# Patient Record
Sex: MALE | Race: WHITE | Employment: UNEMPLOYED | URBAN - METROPOLITAN AREA
[De-identification: names, ages, dates, MRNs, and addresses within clinical notes are randomized per-mention and may not be internally consistent; named-entity substitution may affect disease eponyms.]

---

## 2017-04-04 ENCOUNTER — MEDICAL CORRESPONDENCE (OUTPATIENT)
Dept: HEALTH INFORMATION MANAGEMENT | Facility: CLINIC | Age: 37
End: 2017-04-04

## 2017-08-17 ENCOUNTER — PRE VISIT (OUTPATIENT)
Dept: CARDIOLOGY | Facility: CLINIC | Age: 37
End: 2017-08-17

## 2017-08-17 DIAGNOSIS — Q20.3 TGA (TRANSPOSITION OF GREAT ARTERIES): Primary | ICD-10-CM

## 2017-08-18 ENCOUNTER — OFFICE VISIT (OUTPATIENT)
Dept: CARDIOLOGY | Facility: CLINIC | Age: 37
End: 2017-08-18
Attending: INTERNAL MEDICINE
Payer: COMMERCIAL

## 2017-08-18 VITALS
HEART RATE: 77 BPM | SYSTOLIC BLOOD PRESSURE: 101 MMHG | HEIGHT: 63 IN | BODY MASS INDEX: 23.04 KG/M2 | WEIGHT: 130 LBS | OXYGEN SATURATION: 94 % | DIASTOLIC BLOOD PRESSURE: 67 MMHG

## 2017-08-18 DIAGNOSIS — Q20.3 TGA (TRANSPOSITION OF GREAT ARTERIES): Primary | ICD-10-CM

## 2017-08-18 DIAGNOSIS — Q20.3 TGA (TRANSPOSITION OF GREAT ARTERIES): ICD-10-CM

## 2017-08-18 DIAGNOSIS — I49.8 ARRHYTHMIA, ATRIAL: ICD-10-CM

## 2017-08-18 LAB
ALBUMIN SERPL-MCNC: 3.4 G/DL (ref 3.4–5)
ALP SERPL-CCNC: 192 U/L (ref 40–150)
ALT SERPL W P-5'-P-CCNC: 21 U/L (ref 0–70)
ANION GAP SERPL CALCULATED.3IONS-SCNC: 9 MMOL/L (ref 3–14)
AST SERPL W P-5'-P-CCNC: 20 U/L (ref 0–45)
BASOPHILS # BLD AUTO: 0 10E9/L (ref 0–0.2)
BASOPHILS NFR BLD AUTO: 0.7 %
BILIRUB SERPL-MCNC: 0.9 MG/DL (ref 0.2–1.3)
BUN SERPL-MCNC: 15 MG/DL (ref 7–30)
CALCIUM SERPL-MCNC: 9 MG/DL (ref 8.5–10.1)
CHLORIDE SERPL-SCNC: 101 MMOL/L (ref 94–109)
CHOLEST SERPL-MCNC: 129 MG/DL
CO2 SERPL-SCNC: 28 MMOL/L (ref 20–32)
CREAT SERPL-MCNC: 0.49 MG/DL (ref 0.66–1.25)
DIFFERENTIAL METHOD BLD: ABNORMAL
EOSINOPHIL # BLD AUTO: 0.5 10E9/L (ref 0–0.7)
EOSINOPHIL NFR BLD AUTO: 10.9 %
ERYTHROCYTE [DISTWIDTH] IN BLOOD BY AUTOMATED COUNT: 13.4 % (ref 10–15)
GFR SERPL CREATININE-BSD FRML MDRD: >90 ML/MIN/1.7M2
GLUCOSE SERPL-MCNC: 152 MG/DL (ref 70–99)
HCT VFR BLD AUTO: 39.3 % (ref 40–53)
HDLC SERPL-MCNC: 35 MG/DL
HGB BLD-MCNC: 12.6 G/DL (ref 13.3–17.7)
IMM GRANULOCYTES # BLD: 0 10E9/L (ref 0–0.4)
IMM GRANULOCYTES NFR BLD: 0 %
LDLC SERPL CALC-MCNC: 87 MG/DL
LYMPHOCYTES # BLD AUTO: 0.7 10E9/L (ref 0.8–5.3)
LYMPHOCYTES NFR BLD AUTO: 15.1 %
MCH RBC QN AUTO: 29.6 PG (ref 26.5–33)
MCHC RBC AUTO-ENTMCNC: 32.1 G/DL (ref 31.5–36.5)
MCV RBC AUTO: 93 FL (ref 78–100)
MONOCYTES # BLD AUTO: 0.6 10E9/L (ref 0–1.3)
MONOCYTES NFR BLD AUTO: 12.7 %
NEUTROPHILS # BLD AUTO: 2.7 10E9/L (ref 1.6–8.3)
NEUTROPHILS NFR BLD AUTO: 60.6 %
NONHDLC SERPL-MCNC: 94 MG/DL
NRBC # BLD AUTO: 0 10*3/UL
NRBC BLD AUTO-RTO: 0 /100
PLATELET # BLD AUTO: 214 10E9/L (ref 150–450)
POTASSIUM SERPL-SCNC: 3.9 MMOL/L (ref 3.4–5.3)
PROT SERPL-MCNC: 7.4 G/DL (ref 6.8–8.8)
RBC # BLD AUTO: 4.25 10E12/L (ref 4.4–5.9)
SODIUM SERPL-SCNC: 137 MMOL/L (ref 133–144)
TRIGL SERPL-MCNC: 34 MG/DL
TSH SERPL DL<=0.005 MIU/L-ACNC: 2.78 MU/L (ref 0.4–4)
WBC # BLD AUTO: 4.5 10E9/L (ref 4–11)

## 2017-08-18 PROCEDURE — 99214 OFFICE O/P EST MOD 30 MIN: CPT | Mod: ZP | Performed by: INTERNAL MEDICINE

## 2017-08-18 PROCEDURE — 85025 COMPLETE CBC W/AUTO DIFF WBC: CPT | Performed by: INTERNAL MEDICINE

## 2017-08-18 PROCEDURE — 80053 COMPREHEN METABOLIC PANEL: CPT | Performed by: INTERNAL MEDICINE

## 2017-08-18 PROCEDURE — 80061 LIPID PANEL: CPT | Performed by: INTERNAL MEDICINE

## 2017-08-18 PROCEDURE — 36415 COLL VENOUS BLD VENIPUNCTURE: CPT | Performed by: INTERNAL MEDICINE

## 2017-08-18 PROCEDURE — 84443 ASSAY THYROID STIM HORMONE: CPT | Performed by: INTERNAL MEDICINE

## 2017-08-18 PROCEDURE — 99212 OFFICE O/P EST SF 10 MIN: CPT | Mod: 25,ZF

## 2017-08-18 ASSESSMENT — PAIN SCALES - GENERAL: PAINLEVEL: NO PAIN (0)

## 2017-08-18 NOTE — NURSING NOTE
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 The monitor was placed on the patient with instructions regarding care of the skin electrodes and monitor, as well as documentation in the patient diary. Patient demonstrated understanding of this information and agreed to call with further questions or concerns.  Cardiac Testing: Patient given instructions regarding  echocardiogram . Discussed purpose, preparation, procedure and when to expect results reported back to the patient. Patient demonstrated understanding of this information and agreed to call with further questions or concerns.  Med Reconcile: Reviewed and verified all current medications with the patient. The updated medication list was printed and given to the patient.  Return Appointment: Patient given instructions regarding scheduling next clinic visit. Patient demonstrated understanding of this information and agreed to call with further questions or concerns.  Patient stated he understood all health information given and agreed to call with further questions or concerns.     Celena Grant RN, BSN  Cardiology Care Coordinator  [facility]  [email or web address]  [phone]

## 2017-08-18 NOTE — NURSING NOTE
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    Renea Choe CMA     10:59 AM

## 2017-08-18 NOTE — LETTER
8/18/2017      RE: Migue Wood  [address] [id number]       Dear Colleague,    Thank you for the opportunity to participate in the care of your patient, Migue Wood, at the [facility] HEART [facility]. Please see a copy of my visit note below.    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  He is accompanied by his mother and caregiver.  Overall they relate that patient has been doing well.  They deny denies any c/o of chest pain or pressure, sob/wade, orthopnea, pnd, palpitations, syncope/presyncope or change in chronic carleen.  They deny any problems with medications and reports compliance.        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  Dad HTN.  One brother healthy.  No known family history of early CAD, CHD or SCD.      SOCIAL HISTORY:  History     Social History     Marital Status: Single     Spouse Name: N/A     Number of Children: N/A     Years of Education: N/A     Social History Main Topics     Smoking status: Never Smoker      Smokeless tobacco: None     Alcohol Use:      Drug Use:      Sexually Active:      Other Topics Concern     None     Social History Narrative       CURRENT MEDICATIONS:    Current Outpatient Prescriptions on File Prior to Visit:  zinc oxide 10 % OINT    diazepam (VALIUM) 1 MG TABS Take 1 mg by mouth every 6 hours as needed for anxiety   polyethylene glycol (MIRALAX/GLYCOLAX) powder Take 17 g by mouth daily Per facility  directions   CLINDAMYCIN HCL Solution 1% twice daily to affected area   acetaminophen (TYLENOL) 500 MG tablet Take 1-2 tablets by mouth every 4 hours as needed.   aspirin 81 MG chewable tablet Take 81 mg by mouth daily.   baclofen (LIORESAL) 10 MG tablet Take 0.5 tablets by mouth 3 times daily.   benzoyl peroxide 5 % CREA Externally apply  topically 2 times daily.   clindamycin (CLEOCIN) 150 MG capsule Take 4 capsules by mouth. One hour before dental appointment.   diazepam (VALIUM) 2 MG tablet Take 2 mg by mouth daily.   docusate sodium 100 MG tablet Take 100 mg by mouth daily as needed.   fish oil-omega-3 fatty acids (FISH OIL) 1000 MG capsule Take 4 capsules by mouth daily.   Glycerin, Laxative, (GLYCERIN, ADULT, RE) Place  rectally. Use as directed or every 3 days as needed.   LamoTRIgine 100 MG TBDP Take 1 tablet by mouth 2 times daily.   Magnesium Hydroxide (MILK OF MAGNESIA PO) Take 30 mLs by mouth as needed. Or after 3 days as directed.   sotalol HCl AF (SOTALOL HCL, AF,) 80 MG TABS Take 1 tablet by mouth 2 times daily.   HYDROcodone-acetaminophen (NORCO) 5-325 MG per tablet Take 1 tablet by mouth every 6 hours as needed for moderate to severe pain   calcium-vitamin D (CALCIUM 600 + D) 600-400 MG-UNIT per tablet Take 2 tablets by mouth 2 times daily.     No current facility-administered medications on file prior to visit.       ROS:   Constitutional: No fever, chills, or sweats. No weight gain/loss.   ENT: No visual disturbance, ear ache, epistaxis, sore throat.   Allergies/Immunologic: Negative.   Respiratory: No cough, hemoptysis.   Cardiovascular: As per HPI.   GI: No nausea, vomiting, hematemesis, melena, or hematochezia.   : No urinary frequency, dysuria, or hematuria.   Integument: Negative.   Psychiatric: Negative.   Neuro: Negative.   Endocrinology: Negative.   Musculoskeletal: No changes    EXAM:  /67 (BP Location: Right arm, Patient Position: Chair, Cuff Size: Adult Regular)  Pulse 77  Ht  "1.6 m (5' 3\")  Wt 59 kg (130 lb)  SpO2 94%  BMI 23.03 kg/m2  General: able to answer some simple yes or no questions.  Exam performed with patient in wheelchair  Head: normocephalic, atraumatic  Eyes: anicteric sclera, EOMI  Neck: no adenopathy, 2+ carotids  Orophyarynx: moist mucosa, no lesions, dentition intact  Heart: regular, S1/S2, 3/6 systilic murmur at LSB, no diastolic murmur appreciated, no gallop or rub, estimated JVP 6-7cm  Lungs: clear, no rales or wheezing  Abdomen: soft, non-tender, bowel sounds present, no hepatomegaly  Extremities: no clubbing, cyanosis.  Trace pedal edema.      Labs:  Orders Only on 08/18/2017   Component Date Value Ref Range Status     Sodium 08/18/2017 137  133 - 144 mmol/L Final     Potassium 08/18/2017 3.9  3.4 - 5.3 mmol/L Final     Chloride 08/18/2017 101  94 - 109 mmol/L Final     Carbon Dioxide 08/18/2017 28  20 - 32 mmol/L Final     Anion Gap 08/18/2017 9  3 - 14 mmol/L Final     Glucose 08/18/2017 152* 70 - 99 mg/dL Final     Urea Nitrogen 08/18/2017 15  7 - 30 mg/dL Final     Creatinine 08/18/2017 0.49* 0.66 - 1.25 mg/dL Final     GFR Estimate 08/18/2017 >90  >60 mL/min/1.7m2 Final    Non  GFR Calc     GFR Estimate If Black 08/18/2017 >90  >60 mL/min/1.7m2 Final    African American GFR Calc     Calcium 08/18/2017 9.0  8.5 - 10.1 mg/dL Final     Bilirubin Total 08/18/2017 0.9  0.2 - 1.3 mg/dL Final     Albumin 08/18/2017 3.4  3.4 - 5.0 g/dL Final     Protein Total 08/18/2017 7.4  6.8 - 8.8 g/dL Final     Alkaline Phosphatase 08/18/2017 192* 40 - 150 U/L Final     ALT 08/18/2017 21  0 - 70 U/L Final     AST 08/18/2017 20  0 - 45 U/L Final     WBC 08/18/2017 4.5  4.0 - 11.0 10e9/L Final     RBC Count 08/18/2017 4.25* 4.4 - 5.9 10e12/L Final     Hemoglobin 08/18/2017 12.6* 13.3 - 17.7 g/dL Final     Hematocrit 08/18/2017 39.3* 40.0 - 53.0 % Final     MCV 08/18/2017 93  78 - 100 fl Final     MCH 08/18/2017 29.6  26.5 - 33.0 pg Final     MCHC 08/18/2017 32.1  " 31.5 - 36.5 g/dL Final     RDW 08/18/2017 13.4  10.0 - 15.0 % Final     Platelet Count 08/18/2017 214  150 - 450 10e9/L Final     Diff Method 08/18/2017 Automated Method   Final     % Neutrophils 08/18/2017 60.6  % Final     % Lymphocytes 08/18/2017 15.1  % Final     % Monocytes 08/18/2017 12.7  % Final     % Eosinophils 08/18/2017 10.9  % Final     % Basophils 08/18/2017 0.7  % Final     % Immature Granulocytes 08/18/2017 0.0  % Final     Nucleated RBCs 08/18/2017 0  0 /100 Final     Absolute Neutrophil 08/18/2017 2.7  1.6 - 8.3 10e9/L Final     Absolute Lymphocytes 08/18/2017 0.7* 0.8 - 5.3 10e9/L Final     Absolute Monocytes 08/18/2017 0.6  0.0 - 1.3 10e9/L Final     Absolute Eosinophils 08/18/2017 0.5  0.0 - 0.7 10e9/L Final     Absolute Basophils 08/18/2017 0.0  0.0 - 0.2 10e9/L Final     Abs Immature Granulocytes 08/18/2017 0.0  0 - 0.4 10e9/L Final     Absolute Nucleated RBC 08/18/2017 0.0   Final     TSH 08/18/2017 2.78  0.40 - 4.00 mU/L Final     Cholesterol 08/18/2017 129  <200 mg/dL Final     Triglycerides 08/18/2017 34  <150 mg/dL Final     HDL Cholesterol 08/18/2017 35* >39 mg/dL Final     LDL Cholesterol Calculated 08/18/2017 87  <100 mg/dL Final    Desirable:       <100 mg/dl     Non HDL Cholesterol 08/18/2017 94  <130 mg/dL Final           Assessment and Plan:  38 yo male with PMH of Transposition of the Great Arteries, 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     1.  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  He remains grossly euvolemic today by history and exam.  Today's echo confirmed stable findings. Have discussed with patient's family/caregivers need to continue regular dental  care with SBE prophylaxis and maintain good oral hygiene.  .  3.  H/o atrial arrhythmias:  No recent symptoms.  Holter last month with no significant arrythmias. Contine asa 81mg daily and sotolol 80mg bid.      Follow-up: 1 year with an echo and a 24 hr Holter prior to the appointment.   Will be happy to see sooner if change in clinical status or new questions/concerns arise.        Lacey Jacobsen MD  Section Head - Advanced Heart Failure, Transplantation and Mechanical Circulatory Support  Co-Director - Adult Congenital and Cardiovascular Genetics Center  Associate Professor of Medicine, [facility]

## 2017-08-18 NOTE — MR AVS SNAPSHOT
"              After Visit Summary   8/18/2017    Migue Wood    MRN: [id number]           Patient Information     Date Of Birth          1980        Visit Information        Provider Department      8/18/2017 11:00 AM Lacey Jacobsen MD [facility]        Today's Diagnoses     TGA (transposition of great arteries)    -  1    Arrhythmia, atrial          Care Instructions    You were seen today in the Adult Congenital and Cardiovascular Genetics Clinic at the [facility].    Cardiology Providers you saw during your visit:  Dr. Lacey Jacobsen     Diagnosis:  TGA    Results:  The results of your echo were discussed with you today.    Recommendations:    1.  Continue to eat a heart healthy, low salt diet.  2.  Continue to get 20-30 minutes of aerobic activity, 4-5 days per week.  Examples of aerobic activity include walking, running, swimming, cycling, etc.  3.  Continue to observe good oral hygiene, with regular dental visits.  4.  No changes today.       Vitals:    08/18/17 1050   BP: 101/67   BP Location: Right arm   Patient Position: Chair   Cuff Size: Adult Regular   Pulse: 77   SpO2: 94%   Weight: 59 kg (130 lb)   Height: 1.6 m (5' 3\")       SBE prophylaxis:   Yes_X___  No____    Lifelong Bacterial Endocarditis Prophylaxis:  YES__X__  NO____    If YES is checked, follow the recommendations outlined below:  1. Take antibiotic(s) prior to interventional procedures or surgeries (dental, respiratory, urologic, gastrointestinal, gynecologic), or instrumental examinations.   2. Observe good oral hygiene daily, as advised by your dentist. Get regular professional dental care.  3. Keep cuts clean.  4. Infections should be treated promptly.      Exercise restrictions:   Yes__X__  No____         If yes, list restrictions:  Must be allowed to rest if fatigued or SOB      Work restrictions:  Yes____  No__X__         If yes, list restrictions:      Follow-up:  Follow up with Dr. Jacobsen " in 1 year with an echo and a 24 hr Holter prior to the appointment.       For after hours urgent needs, call [phone] and ask to speak to the Adult Congenital Physician on call.  Mention Job Code 0401.    For emergencies call 911.    For any scheduling needs and to contact your nurse in the Adult Congenital and Cardiovascular Genetics Clinic, please call John Carranza, Procedure , at [phone].    Thank you for your visit today!  If you have questions or concerns about today's visit, please call me.    Celena Grant, RN, BSN  Cardiology Care Coordinator  [facility]  [email or web address]  Ph.[phone]    [facility]  [facility]  Mail Code [id number]   [place]  [address]           Follow-ups after your visit        Follow-up notes from your care team     Return in about 1 year (around 8/18/2018) for ACHD Follow up with Dr. Jacobsen.      Future tests that were ordered for you today     Open Future Orders        Priority Expected Expires Ordered    Holter monitor 24 hour Routine  8/2/2018 8/18/2017    Echo congenital adult Routine  8/18/2018 8/18/2017            Who to contact     If you have questions or need follow up information about today's clinic visit or your schedule please contact [facility] directly at [phone].  Normal or non-critical lab and imaging results will be communicated to you by MyChart, letter or phone within 4 business days after the clinic has received the results. If you do not hear from us within 7 days, please contact the clinic through MyChart or phone. If you have a critical or abnormal lab result, we will notify you by phone as soon as possible.  Submit refill requests through [employer] or call your pharmacy and they will forward the refill request to us. Please allow 3 business days for your refill to be completed.        "   Additional Information About Your Visit        MyChart Information     Ahead lets you send messages to your doctor, view your test results, renew your prescriptions, schedule appointments and more. To sign up, go to [email or web address] . Click on \"Log in\" on the left side of the screen, which will take you to the Welcome page. Then click on \"Sign up Now\" on the right side of the page.     You will be asked to enter the access code listed below, as well as some personal information. Please follow the directions to create your username and password.     Your access code is: [email or web address]  Expires: 2017  6:31 AM     Your access code will  in 90 days. If you need help or a new code, please call your [place] clinic or [phone].        Care EveryWhere ID     This is your Care EveryWhere ID. This could be used by other organizations to access your [place] medical records  [id number]        Your Vitals Were     Pulse Height Pulse Oximetry BMI (Body Mass Index)          77 1.6 m (5' 3\") 94% 23.03 kg/m2         Blood Pressure from Last 3 Encounters:   17 101/67   16 108/67   06/02/15 104/70    Weight from Last 3 Encounters:   17 59 kg (130 lb)   16 64.9 kg (143 lb)   06/02/15 68 kg (150 lb)               Primary Care Provider Office Phone # Fax #    [facility] [phone] [address] [address]        Equal Access to Services     [facility]: Hadii silas lobo hadkirit Sojoy, waaxda luqadaha, qaybta kaalmada wander catalan . So [facility] [phone].    ATENCIÓN: Si habla español, tiene a wiley disposición servicios gratuitos de asistencia lingüística. Llame al [phone].    We comply with applicable federal civil rights laws and [place] laws. We do not discriminate on the basis of race, color, national origin, age, disability sex, sexual orientation or gender identity.            Thank you! "     Thank you for choosing [facility]  for your care. Our goal is always to provide you with excellent care. Hearing back from our patients is one way we can continue to improve our services. Please take a few minutes to complete the written survey that you may receive in the mail after your visit with us. Thank you!             Your Updated Medication List - Protect others around you: Learn how to safely use, store and throw away your medicines at www.disposemymeds.org.          This list is accurate as of: 8/18/17 11:40 AM.  Always use your most recent med list.                   Brand Name Dispense Instructions for use Diagnosis    acetaminophen 500 MG tablet    TYLENOL     Take 1-2 tablets by mouth every 4 hours as needed.        aspirin 81 MG chewable tablet      Take 81 mg by mouth daily.        baclofen 10 MG tablet    LIORESAL     Take 0.5 tablets by mouth 3 times daily.        benzoyl peroxide 5 % Crea      Externally apply  topically 2 times daily.        calcium 600 + D 600-400 MG-UNIT per tablet   Generic drug:  calcium-vitamin D      Take 2 tablets by mouth 2 times daily.        clindamycin 150 MG capsule    CLEOCIN     Take 4 capsules by mouth. One hour before dental appointment.        CLINDAMYCIN HCL      Solution 1% twice daily to affected area        * diazepam 2 MG tablet    VALIUM     Take 2 mg by mouth daily.        * diazepam 1 mg Tabs half-tab    VALIUM     Take 1 mg by mouth every 6 hours as needed for anxiety        docusate sodium 100 MG tablet    COLACE     Take 100 mg by mouth daily as needed.        fish oil-omega-3 fatty acids 1000 MG capsule      Take 4 capsules by mouth daily.        GLYCERIN (ADULT) RE      Place  rectally. Use as directed or every 3 days as needed.        HYDROcodone-acetaminophen 5-325 MG per tablet    NORCO     Take 1 tablet by mouth every 6 hours as needed for moderate to severe pain        lamoTRIgine 100 MG Tbdp ODT tab    LaMICtal     Take 1 tablet by  mouth 2 times daily.        MILK OF MAGNESIA PO      Take 30 mLs by mouth as needed. Or after 3 days as directed.        polyethylene glycol powder    MIRALAX/GLYCOLAX     Take 17 g by mouth daily Per facility directions        sotalol HCl AF 80 MG Tabs      Take 1 tablet by mouth 2 times daily.        zinc oxide 10 % Oint           * Notice:  This list has 2 medication(s) that are the same as other medications prescribed for you. Read the directions carefully, and ask your doctor or other care provider to review them with you.

## 2017-08-18 NOTE — PATIENT INSTRUCTIONS
"You were seen today in the Adult Congenital and Cardiovascular Genetics Clinic at the [facility].    Cardiology Providers you saw during your visit:  Dr. Lacey Jacobsen     Diagnosis:  TGA    Results:  The results of your echo were discussed with you today.    Recommendations:    1.  Continue to eat a heart healthy, low salt diet.  2.  Continue to get 20-30 minutes of aerobic activity, 4-5 days per week.  Examples of aerobic activity include walking, running, swimming, cycling, etc.  3.  Continue to observe good oral hygiene, with regular dental visits.  4.  No changes today.       Vitals:    08/18/17 1050   BP: 101/67   BP Location: Right arm   Patient Position: Chair   Cuff Size: Adult Regular   Pulse: 77   SpO2: 94%   Weight: 59 kg (130 lb)   Height: 1.6 m (5' 3\")       SBE prophylaxis:   Yes_X___  No____    Lifelong Bacterial Endocarditis Prophylaxis:  YES__X__  NO____    If YES is checked, follow the recommendations outlined below:  1. Take antibiotic(s) prior to interventional procedures or surgeries (dental, respiratory, urologic, gastrointestinal, gynecologic), or instrumental examinations.   2. Observe good oral hygiene daily, as advised by your dentist. Get regular professional dental care.  3. Keep cuts clean.  4. Infections should be treated promptly.      Exercise restrictions:   Yes__X__  No____         If yes, list restrictions:  Must be allowed to rest if fatigued or SOB      Work restrictions:  Yes____  No__X__         If yes, list restrictions:      Follow-up:  Follow up with Dr. Jacobsen in 1 year with an echo and a 24 hr Holter prior to the appointment.       For after hours urgent needs, call [phone] and ask to speak to the Adult Congenital Physician on call.  Mention Job Code 0401.    For emergencies call 911.    For any scheduling needs and to contact your nurse in the Adult Congenital and Cardiovascular Genetics Clinic, please call John Carranza, Procedure , at " [phone].    Thank you for your visit today!  If you have questions or concerns about today's visit, please call me.    Celena Grant RN, BSN  Cardiology Care Coordinator  [facility]  [email or web address]  Ph.[phone]    [facility]  [facility]  Mail Code [address]CK  [address] [address]

## 2017-08-18 NOTE — PROGRESS NOTES
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  He is accompanied by his mother and caregiver.  Overall they relate that patient has been doing well.  They deny denies any c/o of chest pain or pressure, sob/wade, orthopnea, pnd, palpitations, syncope/presyncope or change in chronic carleen.  They deny any problems with medications and reports compliance.        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  Dad HTN.  One brother healthy.  No known family history of early CAD, CHD or SCD.      SOCIAL HISTORY:  History     Social History     Marital Status: Single     Spouse Name: N/A     Number of Children: N/A     Years of Education: N/A     Social History Main Topics     Smoking status: Never Smoker      Smokeless tobacco: None     Alcohol Use:      Drug Use:      Sexually Active:      Other Topics Concern     None     Social History Narrative       CURRENT MEDICATIONS:    Current Outpatient Prescriptions on File Prior to Visit:  zinc oxide 10 % OINT    diazepam (VALIUM) 1 MG TABS Take 1 mg by mouth every 6 hours as needed for anxiety   polyethylene glycol (MIRALAX/GLYCOLAX) powder Take 17 g by mouth daily Per facility directions   CLINDAMYCIN HCL Solution 1% twice daily to affected area   acetaminophen (TYLENOL) 500 MG tablet Take 1-2 tablets by mouth every 4 hours as needed.   aspirin 81 MG chewable tablet Take 81 mg by mouth daily.   baclofen (LIORESAL) 10 MG tablet Take 0.5 tablets by mouth 3 times daily.   benzoyl peroxide 5 % CREA  "Externally apply  topically 2 times daily.   clindamycin (CLEOCIN) 150 MG capsule Take 4 capsules by mouth. One hour before dental appointment.   diazepam (VALIUM) 2 MG tablet Take 2 mg by mouth daily.   docusate sodium 100 MG tablet Take 100 mg by mouth daily as needed.   fish oil-omega-3 fatty acids (FISH OIL) 1000 MG capsule Take 4 capsules by mouth daily.   Glycerin, Laxative, (GLYCERIN, ADULT, RE) Place  rectally. Use as directed or every 3 days as needed.   LamoTRIgine 100 MG TBDP Take 1 tablet by mouth 2 times daily.   Magnesium Hydroxide (MILK OF MAGNESIA PO) Take 30 mLs by mouth as needed. Or after 3 days as directed.   sotalol HCl AF (SOTALOL HCL, AF,) 80 MG TABS Take 1 tablet by mouth 2 times daily.   HYDROcodone-acetaminophen (NORCO) 5-325 MG per tablet Take 1 tablet by mouth every 6 hours as needed for moderate to severe pain   calcium-vitamin D (CALCIUM 600 + D) 600-400 MG-UNIT per tablet Take 2 tablets by mouth 2 times daily.     No current facility-administered medications on file prior to visit.       ROS:   Constitutional: No fever, chills, or sweats. No weight gain/loss.   ENT: No visual disturbance, ear ache, epistaxis, sore throat.   Allergies/Immunologic: Negative.   Respiratory: No cough, hemoptysis.   Cardiovascular: As per HPI.   GI: No nausea, vomiting, hematemesis, melena, or hematochezia.   : No urinary frequency, dysuria, or hematuria.   Integument: Negative.   Psychiatric: Negative.   Neuro: Negative.   Endocrinology: Negative.   Musculoskeletal: No changes    EXAM:  /67 (BP Location: Right arm, Patient Position: Chair, Cuff Size: Adult Regular)  Pulse 77  Ht 1.6 m (5' 3\")  Wt 59 kg (130 lb)  SpO2 94%  BMI 23.03 kg/m2  General: able to answer some simple yes or no questions.  Exam performed with patient in wheelchair  Head: normocephalic, atraumatic  Eyes: anicteric sclera, EOMI  Neck: no adenopathy, 2+ carotids  Orophyarynx: moist mucosa, no lesions, dentition " intact  Heart: regular, S1/S2, 3/6 systilic murmur at LSB, no diastolic murmur appreciated, no gallop or rub, estimated JVP 6-7cm  Lungs: clear, no rales or wheezing  Abdomen: soft, non-tender, bowel sounds present, no hepatomegaly  Extremities: no clubbing, cyanosis.  Trace pedal edema.      Labs:  Orders Only on 08/18/2017   Component Date Value Ref Range Status     Sodium 08/18/2017 137  133 - 144 mmol/L Final     Potassium 08/18/2017 3.9  3.4 - 5.3 mmol/L Final     Chloride 08/18/2017 101  94 - 109 mmol/L Final     Carbon Dioxide 08/18/2017 28  20 - 32 mmol/L Final     Anion Gap 08/18/2017 9  3 - 14 mmol/L Final     Glucose 08/18/2017 152* 70 - 99 mg/dL Final     Urea Nitrogen 08/18/2017 15  7 - 30 mg/dL Final     Creatinine 08/18/2017 0.49* 0.66 - 1.25 mg/dL Final     GFR Estimate 08/18/2017 >90  >60 mL/min/1.7m2 Final    Non  GFR Calc     GFR Estimate If Black 08/18/2017 >90  >60 mL/min/1.7m2 Final    African American GFR Calc     Calcium 08/18/2017 9.0  8.5 - 10.1 mg/dL Final     Bilirubin Total 08/18/2017 0.9  0.2 - 1.3 mg/dL Final     Albumin 08/18/2017 3.4  3.4 - 5.0 g/dL Final     Protein Total 08/18/2017 7.4  6.8 - 8.8 g/dL Final     Alkaline Phosphatase 08/18/2017 192* 40 - 150 U/L Final     ALT 08/18/2017 21  0 - 70 U/L Final     AST 08/18/2017 20  0 - 45 U/L Final     WBC 08/18/2017 4.5  4.0 - 11.0 10e9/L Final     RBC Count 08/18/2017 4.25* 4.4 - 5.9 10e12/L Final     Hemoglobin 08/18/2017 12.6* 13.3 - 17.7 g/dL Final     Hematocrit 08/18/2017 39.3* 40.0 - 53.0 % Final     MCV 08/18/2017 93  78 - 100 fl Final     MCH 08/18/2017 29.6  26.5 - 33.0 pg Final     MCHC 08/18/2017 32.1  31.5 - 36.5 g/dL Final     RDW 08/18/2017 13.4  10.0 - 15.0 % Final     Platelet Count 08/18/2017 214  150 - 450 10e9/L Final     Diff Method 08/18/2017 Automated Method   Final     % Neutrophils 08/18/2017 60.6  % Final     % Lymphocytes 08/18/2017 15.1  % Final     % Monocytes 08/18/2017 12.7  % Final     %  Eosinophils 08/18/2017 10.9  % Final     % Basophils 08/18/2017 0.7  % Final     % Immature Granulocytes 08/18/2017 0.0  % Final     Nucleated RBCs 08/18/2017 0  0 /100 Final     Absolute Neutrophil 08/18/2017 2.7  1.6 - 8.3 10e9/L Final     Absolute Lymphocytes 08/18/2017 0.7* 0.8 - 5.3 10e9/L Final     Absolute Monocytes 08/18/2017 0.6  0.0 - 1.3 10e9/L Final     Absolute Eosinophils 08/18/2017 0.5  0.0 - 0.7 10e9/L Final     Absolute Basophils 08/18/2017 0.0  0.0 - 0.2 10e9/L Final     Abs Immature Granulocytes 08/18/2017 0.0  0 - 0.4 10e9/L Final     Absolute Nucleated RBC 08/18/2017 0.0   Final     TSH 08/18/2017 2.78  0.40 - 4.00 mU/L Final     Cholesterol 08/18/2017 129  <200 mg/dL Final     Triglycerides 08/18/2017 34  <150 mg/dL Final     HDL Cholesterol 08/18/2017 35* >39 mg/dL Final     LDL Cholesterol Calculated 08/18/2017 87  <100 mg/dL Final    Desirable:       <100 mg/dl     Non HDL Cholesterol 08/18/2017 94  <130 mg/dL Final           Assessment and Plan:  38 yo male with PMH of Transposition of the Great Arteries, 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     1.  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  He remains grossly euvolemic today by history and exam.  Today's echo confirmed stable findings. Have discussed with patient's family/caregivers need to continue regular dental care with SBE prophylaxis and maintain good oral hygiene.  .  3.  H/o atrial arrhythmias:  No recent symptoms.  Holter last month with no significant arrythmias. Contine asa 81mg daily and sotolol 80mg bid.      Follow-up: 1 year with an echo and a 24 hr Holter prior to the appointment.   Will be happy to see  sooner if change in clinical status or new questions/concerns arise.        Lacey Jacobsen MD  Section Head - Advanced Heart Failure, Transplantation and Mechanical Circulatory Support  Co-Director - Adult Congenital and Cardiovascular Genetics Center  Associate Professor of Medicine, [facility]

## 2018-01-01 ENCOUNTER — TELEPHONE (OUTPATIENT)
Dept: CARDIOLOGY | Facility: CLINIC | Age: 38
End: 2018-01-01

## 2018-01-01 ENCOUNTER — DOCUMENTATION ONLY (OUTPATIENT)
Dept: CARDIOLOGY | Facility: CLINIC | Age: 38
End: 2018-01-01

## 2018-01-01 ENCOUNTER — CARE COORDINATION (OUTPATIENT)
Dept: CARDIOLOGY | Facility: CLINIC | Age: 38
End: 2018-01-01

## 2018-01-01 ENCOUNTER — OFFICE VISIT (OUTPATIENT)
Dept: CARDIOLOGY | Facility: CLINIC | Age: 38
End: 2018-01-01
Attending: INTERNAL MEDICINE
Payer: COMMERCIAL

## 2018-01-01 ENCOUNTER — RADIANT APPOINTMENT (OUTPATIENT)
Dept: CARDIOLOGY | Facility: CLINIC | Age: 38
End: 2018-01-01
Payer: COMMERCIAL

## 2018-01-01 VITALS
BODY MASS INDEX: 21.26 KG/M2 | WEIGHT: 120 LBS | HEIGHT: 63 IN | HEART RATE: 87 BPM | DIASTOLIC BLOOD PRESSURE: 73 MMHG | OXYGEN SATURATION: 90 % | SYSTOLIC BLOOD PRESSURE: 118 MMHG

## 2018-01-01 DIAGNOSIS — Q20.3 D-TGA (DEXTRO-TRANSPOSITION OF GREAT ARTERIES): ICD-10-CM

## 2018-01-01 DIAGNOSIS — Q20.3 TGA (TRANSPOSITION OF GREAT ARTERIES): ICD-10-CM

## 2018-01-01 DIAGNOSIS — I49.8 ATRIAL ARRHYTHMIA: ICD-10-CM

## 2018-01-01 DIAGNOSIS — I49.8 ARRHYTHMIA, ATRIAL: ICD-10-CM

## 2018-01-01 DIAGNOSIS — Q20.3 D-LOOP TRANSPOSITION OF GREAT ARTERIES: Primary | ICD-10-CM

## 2018-01-01 DIAGNOSIS — I49.8 ATRIAL ARRHYTHMIA: Primary | ICD-10-CM

## 2018-01-01 LAB
% SATURATION - QUEST: 13 %
ANION GAP SERPL CALCULATED.3IONS-SCNC: 12 MMOL/L
BUN SERPL-MCNC: 19 MG/DL
CALCIUM SERPL-MCNC: 10.2 MG/DL
CHLORIDE SERPLBLD-SCNC: 96 MMOL/L
CO2 SERPL-SCNC: 32 MMOL/L
CREAT SERPL-MCNC: 0.46 MG/DL
FERRITIN SERPL-MCNC: 124 NG/ML
GFR SERPL CREATININE-BSD FRML MDRD: >60 ML/MIN/1.73M2
GLUCOSE SERPL-MCNC: 110 MG/DL (ref 70–99)
INTERPRETATION ECG - MUSE: NORMAL
IRON: 43 MCG/DL
POTASSIUM SERPL-SCNC: 3.9 MMOL/L
SODIUM SERPL-SCNC: 140 MMOL/L
TIBC - QUEST: 330 MCG/DL
TRANSFERRIN: 264 MG/DL
TRANSFERRIN: 4.3 MG/L

## 2018-01-01 PROCEDURE — 93005 ELECTROCARDIOGRAM TRACING: CPT | Mod: ZF

## 2018-01-01 PROCEDURE — 99215 OFFICE O/P EST HI 40 MIN: CPT | Mod: ZP | Performed by: INTERNAL MEDICINE

## 2018-01-01 PROCEDURE — 93010 ELECTROCARDIOGRAM REPORT: CPT | Mod: ZP | Performed by: INTERNAL MEDICINE

## 2018-01-01 PROCEDURE — 0298T ZZC EXT ECG > 48HR TO 21 DAY REVIEW AND INTERPRETATN: CPT | Performed by: INTERNAL MEDICINE

## 2018-01-01 PROCEDURE — G0463 HOSPITAL OUTPT CLINIC VISIT: HCPCS | Mod: 25,ZF

## 2018-01-01 RX ORDER — FUROSEMIDE 20 MG
20 TABLET ORAL 2 TIMES DAILY
Qty: 180 TABLET | Refills: 3 | Status: SHIPPED | OUTPATIENT
Start: 2018-01-01

## 2018-01-01 RX ORDER — POTASSIUM CHLORIDE 750 MG/1
10 TABLET, EXTENDED RELEASE ORAL 2 TIMES DAILY
Qty: 180 TABLET | Refills: 3 | Status: SHIPPED | OUTPATIENT
Start: 2018-01-01

## 2018-01-01 RX ORDER — LEVOTHYROXINE SODIUM 50 UG/1
50 TABLET ORAL
COMMUNITY
Start: 2018-01-01

## 2018-01-01 RX ORDER — IBUPROFEN 400 MG/1
400 TABLET, FILM COATED ORAL
COMMUNITY
Start: 2018-01-01

## 2018-01-01 RX ORDER — IPRATROPIUM BROMIDE AND ALBUTEROL SULFATE 2.5; .5 MG/3ML; MG/3ML
SOLUTION RESPIRATORY (INHALATION)
COMMUNITY
Start: 2018-01-01

## 2018-01-01 RX ORDER — BENZONATATE 100 MG/1
100-200 CAPSULE ORAL
COMMUNITY

## 2018-01-01 ASSESSMENT — PAIN SCALES - GENERAL: PAINLEVEL: NO PAIN (0)

## 2018-05-09 NOTE — TELEPHONE ENCOUNTER
Group home reporting patient is scheduled for a dental appointment under general anesthesia on 5/14.  Caller reports it is a routine exam but if they find cavities or other problems they will be taken care that day.  Caller would like a letter stating Dr. Jacobsen is in agreement with the patient being put under general anesthesia.  Please call Angus with any questions.  [phone].  The letter he wants from Dr. Jacobsen can be faxed to [phone]  Attn: Radha

## 2018-05-23 NOTE — PROGRESS NOTES
Per patient request, on May 23, 2018 an order was faxed to [facility] requesting a Zio patch to be mailed to the patient. The patient is to wear the zio upon arrival for 14 days.    Pamela Dejesus  Periop Electrophysiology   [phone]

## 2018-06-21 NOTE — PROGRESS NOTES
Received a call from Saud's group home regarding when to have Saud wear the Zio monitor.  Patient to wear Zio for 14 days, patient's follow up appointment is on August 17th.  Instructed to place Zio monitor around July 16th and mail it back as soon as he is done wearing it.  Callers denies any further questions or concerns at this time.    Abhi Spear, RN  RN Care Coordinator  [facility]  [phone]

## 2018-08-13 NOTE — PROGRESS NOTES
From 8/6 - advised patients group home to take off the Zioptach that day even though it had only been ~ 7 days of wear as we would not be able to get results prior to the appt. Group home agreed to remove monitor that day and mail back same day 8/6 in an attempt to get results fr patients appointment on 8/17.    Celena Grant RN, BSN  Cardiology Care Coordinator  [facility]  [email or web address]  [phone]

## 2018-08-17 NOTE — PATIENT INSTRUCTIONS
"You were seen today in the Adult Congenital and Cardiovascular Genetics Clinic at the [facility].    Cardiology Providers you saw during your visit:  Dr. Lacey Jacobsen     Diagnosis:  D-TGA    Results:  The results of your echo and Zio were discussed with you today    Recommendations:    1.  Continue to eat a heart healthy, low salt diet.  2.  Continue to get 20-30 minutes of aerobic activity, 4-5 days per week.  Examples of aerobic activity include walking, running, swimming, cycling, etc.  3.  Continue to observe good oral hygiene, with regular dental visits.  4.  Please start Lasix 20 mg twice daily.  Take once in the morning and once in the afternoon.   5. Please start Potassium (KCl) 10 mEq twice daily.  Once in the morning and once in the evening.   6. Please have labs drawn in 1-2 weeks (BMP and iron studies). We will call you with the results.   7. Please monitor Saud's oxygen levels 3 times per week and call our clinic if it is less than or equal to 88%.  8. We will call and check on Saud in 2-4 weeks to see if he is feeling better.       Vitals:    08/17/18 1140   BP: 118/73   BP Location: Right arm   Patient Position: Chair   Cuff Size: Child   Pulse: 87   SpO2: 90%   Weight: 54.4 kg (120 lb)   Height: 1.6 m (5' 3\")     SBE prophylaxis:   Yes__X__  No____    Lifelong Bacterial Endocarditis Prophylaxis:  YES__X__  NO____    If YES is checked, follow the recommendations outlined below:  1. Take antibiotic(s) prior to recommended dental procedures and procedures on the respiratory tract or with infected skin, muscle or bones. SBE prophylaxis is not needed for routine GI and  procedures (ie. Colonoscopy or vaginal delivery)  2. Observe good oral hygiene daily, as advised by your dentist. Get regular professional dental care.  3. Keep cuts clean.  4. Infections should be treated promptly.  5. Symptoms of Infective Endocarditis could include: fever lasting more than 4-5 days or a recurrent fever " that initially resolves but returns within 1-2 days)      Exercise restrictions:   Yes__X__  No____         If yes, list restrictions:  Must be allowed to rest if fatigued or SOB      Work restrictions:  Yes____  No__X__         If yes, list restrictions:    FASTING CHOLESTEROL was checked in the last 5 years YES_X__  NO___ 2017  Continue to eat a heart healthy, low salt diet.         ____ Fasting lipid panel order today         ____ No changes in medications          ____ I recommend the following changes in your cholesterol medications.:          ____ Please follow up for cholesterol screening at your primary care physician      Follow-up:  Follow up with Dr. Jacobsen in 3 months (if Saud is feeling better we will only do labs, if not we will also do an echo).    If you have questions or concerns please contact us at:    Celean Grant, RN, BSN   Brady Gaona (Scheduling)  Nurse Coordinator     Clinic   Adult Congenital and CV Genetics Adult Congenital and CV Genetic  [facility]  (P)[phone]    (P) [phone]  [email or web address] (F)[phone]        For after hours urgent needs, call [phone] and ask to speak to the Adult Congenital Physician on call.  Mention Job Code 0401.    For emergencies call 911.    [facility]  [facility]   Clinics and [facility] Center  Mail Code [id number]  [address] [place]  [address]

## 2018-08-17 NOTE — MR AVS SNAPSHOT
"              After Visit Summary   8/17/2018    Migue Wood    MRN: [id number]           Patient Information     Date Of Birth          1980        Visit Information        Provider Department      8/17/2018 12:00 PM Lacey Jacobsen MD [facility]        Today's Diagnoses     D-loop transposition of great arteries    -  1      Care Instructions    You were seen today in the Adult Congenital and Cardiovascular Genetics Clinic at the [facility].    Cardiology Providers you saw during your visit:  Dr. Lacey Jacobsen     Diagnosis:  D-TGA    Results:  The results of your echo and Zio were discussed with you today    Recommendations:    1.  Continue to eat a heart healthy, low salt diet.  2.  Continue to get 20-30 minutes of aerobic activity, 4-5 days per week.  Examples of aerobic activity include walking, running, swimming, cycling, etc.  3.  Continue to observe good oral hygiene, with regular dental visits.  4.  Please start Lasix 20 mg twice daily.  Take once in the morning and once in the afternoon.   5. Please start Potassium (KCl) 10 mEq twice daily.  Once in the morning and once in the evening.   6. Please have labs drawn in 1-2 weeks (BMP and iron studies). We will call you with the results.   7. Please monitor Saud's oxygen levels 3 times per week and call our clinic if it is less than or equal to 88%.  8. We will call and check on Saud in 2-4 weeks to see if he is feeling better.       Vitals:    08/17/18 1140   BP: 118/73   BP Location: Right arm   Patient Position: Chair   Cuff Size: Child   Pulse: 87   SpO2: 90%   Weight: 54.4 kg (120 lb)   Height: 1.6 m (5' 3\")     SBE prophylaxis:   Yes__X__  No____    Lifelong Bacterial Endocarditis Prophylaxis:  YES__X__  NO____    If YES is checked, follow the recommendations outlined below:  1. Take antibiotic(s) prior to recommended dental procedures and procedures on the respiratory tract or with infected skin, muscle or " bones. SBE prophylaxis is not needed for routine GI and  procedures (ie. Colonoscopy or vaginal delivery)  2. Observe good oral hygiene daily, as advised by your dentist. Get regular professional dental care.  3. Keep cuts clean.  4. Infections should be treated promptly.  5. Symptoms of Infective Endocarditis could include: fever lasting more than 4-5 days or a recurrent fever that initially resolves but returns within 1-2 days)      Exercise restrictions:   Yes__X__  No____         If yes, list restrictions:  Must be allowed to rest if fatigued or SOB      Work restrictions:  Yes____  No__X__         If yes, list restrictions:    FASTING CHOLESTEROL was checked in the last 5 years YES_X__  NO___ 2017  Continue to eat a heart healthy, low salt diet.         ____ Fasting lipid panel order today         ____ No changes in medications          ____ I recommend the following changes in your cholesterol medications.:          ____ Please follow up for cholesterol screening at your primary care physician      Follow-up:  Follow up with Dr. Jacobsen in 3 months (if Saud is feeling better we will only do labs, if not we will also do an echo).    If you have questions or concerns please contact us at:    Celena Grant RN, BSN   Brady Gaona (Scheduling)  Nurse Coordinator     Clinic   Adult Congenital and CV Genetics Adult Congenital and CV Genetic  [facility]  [phone])[phone]    (P) [phone]  [email or web address] (F)[phone]        For after hours urgent needs, call [phone] and ask to speak to the Adult Congenital Physician on call.  Mention Job Code 0401.    For emergencies call 438.    [facility]   Clinics and Surgery Center  Mail Code [id number]  [address], [place]  [address]           Follow-ups after your visit        Additional Services      Follow-Up with Congenital Heart Clinic                 Your next 10 appointments already scheduled     Nov 23, 2018  9:30 AM CST   Lab with UC LAB   [facility] ([facility])    [address]  1st Floor  [place] [id number] [phone]            Nov 23, 2018 10:00 AM CST   (Arrive by 9:45 AM)   RETURN CONGENITAL HEART with Lacey Jacobsen MD   [facility] ([facility])    [address]  [address]  [place] [address] [phone]              Future tests that were ordered for you today     Open Future Orders        Priority Expected Expires Ordered    Soluble transferrin receptor Routine 8/31/2018 8/17/2019 8/17/2018    CBC with platelets differential Routine 11/16/2018 12/17/2018 8/17/2018    Comprehensive metabolic panel Routine 11/16/2018 12/17/2018 8/17/2018    Follow-Up with Congenital Heart Clinic Routine 11/16/2018 8/17/2019 8/17/2018    IRON Routine 8/31/2018 12/17/2018 8/17/2018    IRON AND IRON BINDING CAPACITY Routine 8/31/2018 12/17/2018 8/17/2018    FERRITIN Routine 8/31/2018 12/17/2018 8/17/2018    Basic metabolic panel Routine 8/31/2018 8/17/2019 8/17/2018            Who to contact     If you have questions or need follow up information about today's clinic visit or your schedule please contact [facility] directly at [phone].  Normal or non-critical lab and imaging results will be communicated to you by MyChart, letter or phone within 4 business days after the clinic has received the results. If you do not hear from us within 7 days, please contact the clinic through MyChart or phone. If you have a critical or abnormal lab result, we will notify you by phone as soon as possible.  Submit refill requests through [employer] or call your pharmacy and they will forward the refill request to us. Please allow 3 business days for your refill to be completed.          Additional Information About Your  "Visit        [employer] Information     [employer] lets you send messages to your doctor, view your test results, renew your prescriptions, schedule appointments and more. To sign up, go to [email or web address] . Click on \"Log in\" on the left side of the screen, which will take you to the Welcome page. Then click on \"Sign up Now\" on the right side of the page.     You will be asked to enter the access code listed below, as well as some personal information. Please follow the directions to create your username and password.     Your access code is: [email or web address]  Expires: 2018  6:30 AM     Your access code will  in 90 days. If you need help or a new code, please call your [facility] clinic or [phone].        Care EveryWhere ID     This is your Care EveryWhere ID. This could be used by other organizations to access your [facility] medical records  [id number]        Your Vitals Were     Pulse Height Pulse Oximetry BMI (Body Mass Index)          87 1.6 m (5' 3\") 90% 21.26 kg/m2         Blood Pressure from Last 3 Encounters:   18 118/73   17 101/67   16 108/67    Weight from Last 3 Encounters:   18 54.4 kg (120 lb)   17 59 kg (130 lb)   16 64.9 kg (143 lb)              We Performed the Following     EKG 12-lead, tracing only (Same Day)          Today's Medication Changes          These changes are accurate as of 18  1:08 PM.  If you have any questions, ask your nurse or doctor.               Start taking these medicines.        Dose/Directions    furosemide 20 MG tablet   Commonly known as:  LASIX   Used for:  D-loop transposition of great arteries   Started by:  Lacey Jacobsen MD        Dose:  20 mg   Take 1 tablet (20 mg) by mouth 2 times daily   Quantity:  180 tablet   Refills:  3       potassium chloride SA 10 MEQ CR tablet   Commonly known as:  K-DUR/KLOR-CON M   Used for:  D-loop transposition of great arteries   Started by:  Lacey Jacobsen MD        " Dose:  10 mEq   Take 1 tablet (10 mEq) by mouth 2 times daily   Quantity:  180 tablet   Refills:  3            Where to get your medicines      These medications were sent to [facility] - [place], [place] - [address]  [address] [facility] [address]     Phone:  [phone]     furosemide 20 MG tablet    potassium chloride SA 10 MEQ CR tablet                Primary Care Provider Office Phone # Fax #    [facility] [phone] [address] [address]        Equal Access to Services     [facility]: Hadii aad ku hadasho Soomaali, waaxda luqadaha, qaybta kaalmada adeeladioyasedrick, wander alvarez . So [facility] [phone].    ATENCIÓN: Si habla español, tiene a wiley disposición servicios gratuitos de asistencia lingüística. [facility] [phone].    We comply with applicable federal civil rights laws and [place] laws. We do not discriminate on the basis of race, color, national origin, age, disability, sex, sexual orientation, or gender identity.            Thank you!     Thank you for choosing [facility]  for your care. Our goal is always to provide you with excellent care. Hearing back from our patients is one way we can continue to improve our services. Please take a few minutes to complete the written survey that you may receive in the mail after your visit with us. Thank you!             Your Updated Medication List - Protect others around you: Learn how to safely use, store and throw away your medicines at www.disposemymeds.org.          This list is accurate as of 8/17/18  1:08 PM.  Always use your most recent med list.                   Brand Name Dispense Instructions for use Diagnosis    acetaminophen 500 MG tablet    TYLENOL     Take 1-2 tablets by mouth every 4 hours as needed.        aspirin 81 MG chewable tablet      Take 81 mg by mouth daily.        baclofen 10 MG tablet    LIORESAL     Take 0.5  tablets by mouth 3 times daily.        benzonatate 100 MG capsule    TESSALON     Take 100-200 mg by mouth        benzoyl peroxide 5 % Crea      Externally apply  topically 2 times daily.        calcium 600 + D 600-400 MG-UNIT per tablet   Generic drug:  calcium-vitamin D      Take 2 tablets by mouth 2 times daily.        clindamycin 150 MG capsule    CLEOCIN     Take 4 capsules by mouth. One hour before dental appointment.        CLINDAMYCIN HCL      Solution 1% twice daily to affected area        * diazepam 2 MG tablet    VALIUM     Take 2 mg by mouth daily.        * diazepam 1 mg Tabs half-tab    VALIUM     Take 1 mg by mouth every 6 hours as needed for anxiety        docusate sodium 100 MG tablet    COLACE     Take 100 mg by mouth daily as needed.        fish oil-omega-3 fatty acids 1000 MG capsule      Take 4 capsules by mouth daily.        furosemide 20 MG tablet    LASIX    180 tablet    Take 1 tablet (20 mg) by mouth 2 times daily    D-loop transposition of great arteries       GLYCERIN (ADULT) RE      Place  rectally. Use as directed or every 3 days as needed.        HYDROcodone-acetaminophen 5-325 MG per tablet    NORCO     Take 1 tablet by mouth every 6 hours as needed for moderate to severe pain        ibuprofen 400 MG tablet    ADVIL/MOTRIN     Take 400 mg by mouth        ipratropium - albuterol 0.5 mg/2.5 mg/3 mL 0.5-2.5 (3) MG/3ML neb solution    DUONEB     Use twice daily and up to 4 times in a day if needed.        lamoTRIgine 100 MG Tbdp ODT tab    LaMICtal     Take 1 tablet by mouth 2 times daily.        levothyroxine 50 MCG tablet    SYNTHROID/LEVOTHROID     Take 50 mcg by mouth        MILK OF MAGNESIA PO      Take 30 mLs by mouth as needed. Or after 3 days as directed.        polyethylene glycol powder    MIRALAX/GLYCOLAX     Take 17 g by mouth daily Per facility directions        potassium chloride SA 10 MEQ CR tablet    K-DUR/KLOR-CON M    180 tablet    Take 1 tablet (10 mEq) by mouth 2 times  daily    D-loop transposition of great arteries       sotalol HCl AF 80 MG Tabs      Take 1 tablet by mouth 2 times daily.        zinc oxide 10 % Oint           * Notice:  This list has 2 medication(s) that are the same as other medications prescribed for you. Read the directions carefully, and ask your doctor or other care provider to review them with you.

## 2018-08-17 NOTE — NURSING NOTE
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 Discussed purpose, preparation, procedure and when to expect results reported back to the patient. Patient demonstrated understanding of this information and agreed to call with further questions or concerns.  Labs: Patient was given results of the laboratory testing obtained today. Patient was instructed to return for the next laboratory testing in 2-4 weeks and 3 months . Patient demonstrated understanding of this information and agreed to call with further questions or concerns.   Med Reconcile: Reviewed and verified all current medications with the patient. The updated medication list was printed and given to the patient.  Return Appointment: Patient given instructions regarding scheduling next clinic visit. Patient demonstrated understanding of this information and agreed to call with further questions or concerns.  Patient stated he understood all health information given and agreed to call with further questions or concerns.     Celena Grant RN, BSN  Cardiology Care Coordinator  [facility]  [email or web address]  [phone]

## 2018-08-17 NOTE — LETTER
"8/17/2018    RE: Migue Wood  [address] [facility] [address]       Dear Colleague,    Thank you for the opportunity to participate in the care of your patient, Migue Wood, at the [facility] HEART [facility] at [facility]. Please see a copy of my visit note below.    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  He is accompanied by his parents.  They relate that Saud had \"double pneumonia\" this past fall.  He developed gluteal pressure ulcers during that time which is still problematic.  His parents note that he just hasn't seemed to rebound after that illness.  In May they have noted increased lower extremity edema, some episodic peripheral cyanosis, decreased po intake as well as decreased interactiveness.  He has been receiving pain medications and anxiety medications so this could be contributing to decreased interactiveness as well.   They deny any problems with medications and reports compliance.        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  Dad HTN.  One brother healthy.  No known family history of early CAD, CHD or SCD.      SOCIAL HISTORY:  History     Social History     Marital Status: Single     Spouse Name: N/A     Number of Children: N/A     Years of Education: N/A     Social History Main Topics     Smoking status: Never Smoker      Smokeless tobacco: None     Alcohol Use:      Drug Use:      Sexually Active:      Other Topics " Concern     None     Social History Narrative       CURRENT MEDICATIONS:    Current Outpatient Prescriptions on File Prior to Visit:  acetaminophen (TYLENOL) 500 MG tablet Take 1-2 tablets by mouth every 4 hours as needed.   aspirin 81 MG chewable tablet Take 81 mg by mouth daily.   baclofen (LIORESAL) 10 MG tablet Take 0.5 tablets by mouth 3 times daily.   benzoyl peroxide 5 % CREA Externally apply  topically 2 times daily.   calcium-vitamin D (CALCIUM 600 + D) 600-400 MG-UNIT per tablet Take 2 tablets by mouth 2 times daily.   clindamycin (CLEOCIN) 150 MG capsule Take 4 capsules by mouth. One hour before dental appointment.   CLINDAMYCIN HCL Solution 1% twice daily to affected area   diazepam (VALIUM) 1 MG TABS Take 1 mg by mouth every 6 hours as needed for anxiety   diazepam (VALIUM) 2 MG tablet Take 2 mg by mouth daily.   docusate sodium 100 MG tablet Take 100 mg by mouth daily as needed.   fish oil-omega-3 fatty acids (FISH OIL) 1000 MG capsule Take 4 capsules by mouth daily.   Glycerin, Laxative, (GLYCERIN, ADULT, RE) Place  rectally. Use as directed or every 3 days as needed.   HYDROcodone-acetaminophen (NORCO) 5-325 MG per tablet Take 1 tablet by mouth every 6 hours as needed for moderate to severe pain   LamoTRIgine 100 MG TBDP Take 1 tablet by mouth 2 times daily.   Magnesium Hydroxide (MILK OF MAGNESIA PO) Take 30 mLs by mouth as needed. Or after 3 days as directed.   polyethylene glycol (MIRALAX/GLYCOLAX) powder Take 17 g by mouth daily Per facility directions   sotalol HCl AF (SOTALOL HCL, AF,) 80 MG TABS Take 1 tablet by mouth 2 times daily.   zinc oxide 10 % OINT      No current facility-administered medications on file prior to visit.       ROS:   Constitutional: No recent fever, chills, or sweats.   ENT: No visual disturbance, ear ache, epistaxis, sore throat.   Allergies/Immunologic: Negative.   Respiratory: No cough, hemoptysis.   Cardiovascular: As per HPI.   GI: Decreased po intake.  No nausea,  "vomiting, hematemesis, melena, or hematochezia.   : No urinary frequency, dysuria, or hematuria.   Integument: Negative.   Psychiatric: Negative.   Neuro: decreased interactiveness   Endocrinology: Negative.   Musculoskeletal: No changes    EXAM:  /73 (BP Location: Right arm, Patient Position: Chair, Cuff Size: Child)  Pulse 87  Ht 1.6 m (5' 3\")  Wt 54.4 kg (120 lb)  SpO2 90%  BMI 21.26 kg/m2  General: will not answer some simple yes or no questions today.  Exam performed with patient in wheelchair  Head: normocephalic, atraumatic  Eyes: anicteric sclera, EOMI  Neck: no adenopathy, 2+ carotids  Orophyarynx: moist mucosa, no lesions, dentition intact  Heart: regular, S1/S2, 3/6 systilic murmur at LSB, no diastolic murmur appreciated, no gallop or rub, estimated JVP 8-10cm  Lungs: clear, no rales or wheezing  Abdomen: soft, non-tender, bowel sounds present, no hepatomegaly  Extremities: no clubbing, cyanosis.  1-2+ lower extremityl edema.      Labs:  Orders Only on 08/18/2017   Component Date Value Ref Range Status     Sodium 08/18/2017 137  133 - 144 mmol/L Final     Potassium 08/18/2017 3.9  3.4 - 5.3 mmol/L Final     Chloride 08/18/2017 101  94 - 109 mmol/L Final     Carbon Dioxide 08/18/2017 28  20 - 32 mmol/L Final     Anion Gap 08/18/2017 9  3 - 14 mmol/L Final     Glucose 08/18/2017 152* 70 - 99 mg/dL Final     Urea Nitrogen 08/18/2017 15  7 - 30 mg/dL Final     Creatinine 08/18/2017 0.49* 0.66 - 1.25 mg/dL Final     GFR Estimate 08/18/2017 >90  >60 mL/min/1.7m2 Final    Non  GFR Calc     GFR Estimate If Black 08/18/2017 >90  >60 mL/min/1.7m2 Final    African American GFR Calc     Calcium 08/18/2017 9.0  8.5 - 10.1 mg/dL Final     Bilirubin Total 08/18/2017 0.9  0.2 - 1.3 mg/dL Final     Albumin 08/18/2017 3.4  3.4 - 5.0 g/dL Final     Protein Total 08/18/2017 7.4  6.8 - 8.8 g/dL Final     Alkaline Phosphatase 08/18/2017 192* 40 - 150 U/L Final     ALT 08/18/2017 21  0 - 70 U/L " Final     AST 08/18/2017 20  0 - 45 U/L Final     WBC 08/18/2017 4.5  4.0 - 11.0 10e9/L Final     RBC Count 08/18/2017 4.25* 4.4 - 5.9 10e12/L Final     Hemoglobin 08/18/2017 12.6* 13.3 - 17.7 g/dL Final     Hematocrit 08/18/2017 39.3* 40.0 - 53.0 % Final     MCV 08/18/2017 93  78 - 100 fl Final     MCH 08/18/2017 29.6  26.5 - 33.0 pg Final     MCHC 08/18/2017 32.1  31.5 - 36.5 g/dL Final     RDW 08/18/2017 13.4  10.0 - 15.0 % Final     Platelet Count 08/18/2017 214  150 - 450 10e9/L Final     Diff Method 08/18/2017 Automated Method   Final     % Neutrophils 08/18/2017 60.6  % Final     % Lymphocytes 08/18/2017 15.1  % Final     % Monocytes 08/18/2017 12.7  % Final     % Eosinophils 08/18/2017 10.9  % Final     % Basophils 08/18/2017 0.7  % Final     % Immature Granulocytes 08/18/2017 0.0  % Final     Nucleated RBCs 08/18/2017 0  0 /100 Final     Absolute Neutrophil 08/18/2017 2.7  1.6 - 8.3 10e9/L Final     Absolute Lymphocytes 08/18/2017 0.7* 0.8 - 5.3 10e9/L Final     Absolute Monocytes 08/18/2017 0.6  0.0 - 1.3 10e9/L Final     Absolute Eosinophils 08/18/2017 0.5  0.0 - 0.7 10e9/L Final     Absolute Basophils 08/18/2017 0.0  0.0 - 0.2 10e9/L Final     Abs Immature Granulocytes 08/18/2017 0.0  0 - 0.4 10e9/L Final     Absolute Nucleated RBC 08/18/2017 0.0   Final     TSH 08/18/2017 2.78  0.40 - 4.00 mU/L Final     Cholesterol 08/18/2017 129  <200 mg/dL Final     Triglycerides 08/18/2017 34  <150 mg/dL Final     HDL Cholesterol 08/18/2017 35* >39 mg/dL Final     LDL Cholesterol Calculated 08/18/2017 87  <100 mg/dL Final    Desirable:       <100 mg/dl     Non HDL Cholesterol 08/18/2017 94  <130 mg/dL Final       Zio monitor 8/18        Echo today reviewed by me:  Patient after atrial switch operation for complete transposition of the great arteries. Technically difficult study due to poor acoustic windows. There is mild to moderate right ventricular enlargement. The right ventricular function is qualitatively  mildly depressed. Normal left ventricular size and systolic function. There is a small baffle leak from the systemic venous atrium to the  pulmonary venous atrium. There is a small VSD, shunting primarily from the pulmonary ventricle to the systemic ventricle. Moderate (3+) tricuspid valve insufficiency. The tricuspid valve dP/dt is 808 mmHg/sec. Trivial mitral valve insufficiency with pulmonary ventricle pressure is at least 80mmHg. above central venous pressure. IVS is flat in systole.      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     1.  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  Exam today reveals increased JVD and pedal edema compared to prior.  O2 saturation is lower today and echo reveals increased pulmonary pressures and small baffle leak that is now shunting right to left (from the systemic venous atrium to the pulmonary venous atrium) which is consistent with his decreased O2 saturations and observation of periodic peripheral cyanosis by parents.  Had long discussion with patient's parents concerning treatment options.  Could proceed with most aggressive option of obtaining RHC to define cardiac hemodynamics and determine etiology of pulmonary hypertension but given patient's severe developmental delay feel likely that this would require general anesthesia which if patient does indeed have severe pulmonary HTN would carry an increased risk.  Other option would be to focus on symptomatic treatment and begin trial of low dose lasix  and see if symptoms improve.  Patient parents would like to proceed with symptomatic treatment approach for now.  Will begin Lasix 20 mg twice daily.   Will also begin KCl 10 mEq twice daily.   Will recheck labs  in 1-2 weeks (BMP and iron studies).  Instructed to monitor Saud's oxygen levels 3 times per week and call our clinic if it is less than or equal to 88% at which time we would prescribe home O2.      2.  Atrial arrhythmias:  Recent Holter and today's EKG as above.  Continue asa 81mg daily and sotolol 80mg bid.      Follow-up: in 3 months (if Saud is feeling better we will only do labs, if not we will also do an echo)..   Will be happy to see sooner if change in clinical status or new questions/concerns arise.      Lacey Jacobsen MD  Section Head - Advanced Heart Failure, Transplantation and Mechanical Circulatory Support  Co-Director - Adult Congenital and Cardiovascular [facility]etics [facility]  Associate Professor of Medicine, [facility]    I spent 60 minutes in critical care of the patient including 45 minutes of direct patient care including discussion with the patient's family.

## 2018-08-17 NOTE — NURSING NOTE
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 EKG was performed.   Alondra Robbins MA

## 2018-08-27 NOTE — TELEPHONE ENCOUNTER
Angus from patient's group home called asking for lab (BMP, Iron) orders to be faxed to Elham FRAZIER, @ [facility] (Fax: [phone]).

## 2018-08-28 NOTE — TELEPHONE ENCOUNTER
Patient down from 113lbs to 90lbs after taking the Lasix Dr. Jacobsen had recommended. Mom (Mallory) looking to speak with a RN.

## 2018-09-04 NOTE — PROGRESS NOTES
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  He is accompanied by his parents.  They relate that Saud had \"double pneumonia\" this past fall.  He developed gluteal pressure ulcers during that time which is still problematic.  His parents note that he just hasn't seemed to rebound after that illness.  In May they have noted increased lower extremity edema, some episodic peripheral cyanosis, decreased po intake as well as decreased interactiveness.  He has been receiving pain medications and anxiety medications so this could be contributing to decreased interactiveness as well.   They deny any problems with medications and reports compliance.        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  Dad HTN.  One brother healthy.  No known family history of early CAD, CHD or SCD.      SOCIAL HISTORY:  History     Social History     Marital Status: Single     Spouse Name: N/A     Number of Children: N/A     Years of Education: N/A     Social History Main Topics     Smoking status: Never Smoker      Smokeless tobacco: None     Alcohol Use:      Drug Use:      Sexually Active:      Other Topics Concern     None     Social History Narrative       CURRENT MEDICATIONS:    Current Outpatient Prescriptions on File Prior to Visit:  acetaminophen (TYLENOL) 500 MG tablet Take 1-2 tablets by mouth every 4 hours as needed.   aspirin 81 MG chewable tablet Take 81 mg by mouth daily.   baclofen (LIORESAL) 10 MG tablet Take 0.5 " tablets by mouth 3 times daily.   benzoyl peroxide 5 % CREA Externally apply  topically 2 times daily.   calcium-vitamin D (CALCIUM 600 + D) 600-400 MG-UNIT per tablet Take 2 tablets by mouth 2 times daily.   clindamycin (CLEOCIN) 150 MG capsule Take 4 capsules by mouth. One hour before dental appointment.   CLINDAMYCIN HCL Solution 1% twice daily to affected area   diazepam (VALIUM) 1 MG TABS Take 1 mg by mouth every 6 hours as needed for anxiety   diazepam (VALIUM) 2 MG tablet Take 2 mg by mouth daily.   docusate sodium 100 MG tablet Take 100 mg by mouth daily as needed.   fish oil-omega-3 fatty acids (FISH OIL) 1000 MG capsule Take 4 capsules by mouth daily.   Glycerin, Laxative, (GLYCERIN, ADULT, RE) Place  rectally. Use as directed or every 3 days as needed.   HYDROcodone-acetaminophen (NORCO) 5-325 MG per tablet Take 1 tablet by mouth every 6 hours as needed for moderate to severe pain   LamoTRIgine 100 MG TBDP Take 1 tablet by mouth 2 times daily.   Magnesium Hydroxide (MILK OF MAGNESIA PO) Take 30 mLs by mouth as needed. Or after 3 days as directed.   polyethylene glycol (MIRALAX/GLYCOLAX) powder Take 17 g by mouth daily Per facility directions   sotalol HCl AF (SOTALOL HCL, AF,) 80 MG TABS Take 1 tablet by mouth 2 times daily.   zinc oxide 10 % OINT      No current facility-administered medications on file prior to visit.       ROS:   Constitutional: No recent fever, chills, or sweats.   ENT: No visual disturbance, ear ache, epistaxis, sore throat.   Allergies/Immunologic: Negative.   Respiratory: No cough, hemoptysis.   Cardiovascular: As per HPI.   GI: Decreased po intake.  No nausea, vomiting, hematemesis, melena, or hematochezia.   : No urinary frequency, dysuria, or hematuria.   Integument: Negative.   Psychiatric: Negative.   Neuro: decreased interactiveness   Endocrinology: Negative.   Musculoskeletal: No changes    EXAM:  /73 (BP Location: Right arm, Patient Position: Chair, Cuff Size:  "Child)  Pulse 87  Ht 1.6 m (5' 3\")  Wt 54.4 kg (120 lb)  SpO2 90%  BMI 21.26 kg/m2  General: will not answer some simple yes or no questions today.  Exam performed with patient in wheelchair  Head: normocephalic, atraumatic  Eyes: anicteric sclera, EOMI  Neck: no adenopathy, 2+ carotids  Orophyarynx: moist mucosa, no lesions, dentition intact  Heart: regular, S1/S2, 3/6 systilic murmur at LSB, no diastolic murmur appreciated, no gallop or rub, estimated JVP 8-10cm  Lungs: clear, no rales or wheezing  Abdomen: soft, non-tender, bowel sounds present, no hepatomegaly  Extremities: no clubbing, cyanosis.  1-2+ lower extremityl edema.      Labs:  Orders Only on 08/18/2017   Component Date Value Ref Range Status     Sodium 08/18/2017 137  133 - 144 mmol/L Final     Potassium 08/18/2017 3.9  3.4 - 5.3 mmol/L Final     Chloride 08/18/2017 101  94 - 109 mmol/L Final     Carbon Dioxide 08/18/2017 28  20 - 32 mmol/L Final     Anion Gap 08/18/2017 9  3 - 14 mmol/L Final     Glucose 08/18/2017 152* 70 - 99 mg/dL Final     Urea Nitrogen 08/18/2017 15  7 - 30 mg/dL Final     Creatinine 08/18/2017 0.49* 0.66 - 1.25 mg/dL Final     GFR Estimate 08/18/2017 >90  >60 mL/min/1.7m2 Final    Non  GFR Calc     GFR Estimate If Black 08/18/2017 >90  >60 mL/min/1.7m2 Final    African American GFR Calc     Calcium 08/18/2017 9.0  8.5 - 10.1 mg/dL Final     Bilirubin Total 08/18/2017 0.9  0.2 - 1.3 mg/dL Final     Albumin 08/18/2017 3.4  3.4 - 5.0 g/dL Final     Protein Total 08/18/2017 7.4  6.8 - 8.8 g/dL Final     Alkaline Phosphatase 08/18/2017 192* 40 - 150 U/L Final     ALT 08/18/2017 21  0 - 70 U/L Final     AST 08/18/2017 20  0 - 45 U/L Final     WBC 08/18/2017 4.5  4.0 - 11.0 10e9/L Final     RBC Count 08/18/2017 4.25* 4.4 - 5.9 10e12/L Final     Hemoglobin 08/18/2017 12.6* 13.3 - 17.7 g/dL Final     Hematocrit 08/18/2017 39.3* 40.0 - 53.0 % Final     MCV 08/18/2017 93  78 - 100 fl Final     MCH 08/18/2017 29.6  " 26.5 - 33.0 pg Final     MCHC 08/18/2017 32.1  31.5 - 36.5 g/dL Final     RDW 08/18/2017 13.4  10.0 - 15.0 % Final     Platelet Count 08/18/2017 214  150 - 450 10e9/L Final     Diff Method 08/18/2017 Automated Method   Final     % Neutrophils 08/18/2017 60.6  % Final     % Lymphocytes 08/18/2017 15.1  % Final     % Monocytes 08/18/2017 12.7  % Final     % Eosinophils 08/18/2017 10.9  % Final     % Basophils 08/18/2017 0.7  % Final     % Immature Granulocytes 08/18/2017 0.0  % Final     Nucleated RBCs 08/18/2017 0  0 /100 Final     Absolute Neutrophil 08/18/2017 2.7  1.6 - 8.3 10e9/L Final     Absolute Lymphocytes 08/18/2017 0.7* 0.8 - 5.3 10e9/L Final     Absolute Monocytes 08/18/2017 0.6  0.0 - 1.3 10e9/L Final     Absolute Eosinophils 08/18/2017 0.5  0.0 - 0.7 10e9/L Final     Absolute Basophils 08/18/2017 0.0  0.0 - 0.2 10e9/L Final     Abs Immature Granulocytes 08/18/2017 0.0  0 - 0.4 10e9/L Final     Absolute Nucleated RBC 08/18/2017 0.0   Final     TSH 08/18/2017 2.78  0.40 - 4.00 mU/L Final     Cholesterol 08/18/2017 129  <200 mg/dL Final     Triglycerides 08/18/2017 34  <150 mg/dL Final     HDL Cholesterol 08/18/2017 35* >39 mg/dL Final     LDL Cholesterol Calculated 08/18/2017 87  <100 mg/dL Final    Desirable:       <100 mg/dl     Non HDL Cholesterol 08/18/2017 94  <130 mg/dL Final       Zio monitor 8/18        Echo today reviewed by me:  Patient after atrial switch operation for complete transposition of the great arteries. Technically difficult study due to poor acoustic windows. There is mild to moderate right ventricular enlargement. The right ventricular function is qualitatively mildly depressed. Normal left ventricular size and systolic function. There is a small baffle leak from the systemic venous atrium to the  pulmonary venous atrium. There is a small VSD, shunting primarily from the pulmonary ventricle to the systemic ventricle. Moderate (3+) tricuspid valve insufficiency. The tricuspid valve  dP/dt is 808 mmHg/sec. Trivial mitral valve insufficiency with pulmonary ventricle pressure is at least 80mmHg. above central venous pressure. IVS is flat in systole.      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     1.  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  Exam today reveals increased JVD and pedal edema compared to prior.  O2 saturation is lower today and echo reveals increased pulmonary pressures and small baffle leak that is now shunting right to left (from the systemic venous atrium to the pulmonary venous atrium) which is consistent with his decreased O2 saturations and observation of periodic peripheral cyanosis by parents.  Had long discussion with patient's parents concerning treatment options.  Could proceed with most aggressive option of obtaining RHC to define cardiac hemodynamics and determine etiology of pulmonary hypertension but given patient's severe developmental delay feel likely that this would require general anesthesia which if patient does indeed have severe pulmonary HTN would carry an increased risk.  Other option would be to focus on symptomatic treatment and begin trial of low dose lasix and see if symptoms improve.  Patient parents would like to proceed with symptomatic treatment approach for now.  Will begin Lasix 20 mg twice daily.  Will also begin KCl 10 mEq twice daily.  Will recheck labs  in 1-2 weeks (BMP and iron studies).  Instructed to monitor Saud's oxygen levels 3 times per week and call our  clinic if it is less than or equal to 88% at which time we would prescribe home O2.      2.  Atrial arrhythmias:  Recent Holter and today's EKG as above.  Continue asa 81mg daily and sotolol 80mg bid.      Follow-up: in 3 months (if Sadu is feeling better we will only do labs, if not we will also do an echo)..   Will be happy to see sooner if change in clinical status or new questions/concerns arise.        Lacey Jacobsen MD  Section Head - Advanced Heart Failure, Transplantation and Mechanical Circulatory Support  Co-Director - Adult Congenital and Cardiovascular Genetics Center  Associate Professor of Medicine, [facility]      I spent 60 minutes in critical care of the patient including 45 minutes of direct patient care including discussion with the patient's family.

## 2018-09-05 ENCOUNTER — CARE COORDINATION (OUTPATIENT)
Dept: CARDIOLOGY | Facility: CLINIC | Age: 38
End: 2018-09-05

## 2018-09-05 ENCOUNTER — TELEPHONE (OUTPATIENT)
Dept: CARDIOLOGY | Facility: CLINIC | Age: 38
End: 2018-09-05

## 2018-09-05 NOTE — PROGRESS NOTES
From 8/29/2018: Saud's mom called and he has gone from 113lb to 90lbs since starting the Lasix and Kcl replacement at his appt.  She is very concerned with his weight.  I told her that Lasix will only pull off excess fluid so we are probably now seeing his real weight and possible nurtritional deficits? She said the LE edema is gone. She said it is a struggle to get his home to feed him properly - in her opinion.  He is currently drinking Boost regularly and she brought him some of his favorite foods to encourage his appetite.  His labs were drawn yesterday that we requested - BMP, iron panel and CBC and am awaiting those results to be faxed.     Labs reviewed which were all WNL for patient. No new orders from Dr. Jacobsen. Advised mom to continue to watching his intake and to call if he does not improve.  Let mom know that per Dr. Jacobsen his appetite may improve now that we removed excess fluid and will hopefully increase his activity level.      9/5/2018 - received call updating us on the passing of Migue on 9/2/2018.  Will update appropriate personnel in regards to his passing.    Celena Grant, RN, BSN  Cardiology Care Coordinator  [facility]  [email or web address]  [phone]

## 2018-09-05 NOTE — TELEPHONE ENCOUNTER
"Mallory called to inform us that Saud passed away on Sunday (09/02). Asked that we let Dr. Jacobsen know. When asked if there was anything we could do during this time, Mallory requested that Dr. Jacobsen reach out to/inform Dr. Olivia Rowland, as she has worked with Saud from \"Day 1\".  "